# Patient Record
(demographics unavailable — no encounter records)

---

## 2025-03-28 NOTE — PHYSICAL EXAM
[Wheezing] : wheezing [Subcostal Retractions] : subcostal retractions [Suprasternal Retractions] : suprasternal retractions [NL] : warm, clear

## 2025-03-28 NOTE — HISTORY OF PRESENT ILLNESS
[de-identified] : TROUBLE BREATHING [FreeTextEntry6] : trouble breathing for past 3 hours sent home from school by nurse for fever and wheezing albuterol given at 10:30 am feels tired and achy

## 2025-03-28 NOTE — DISCUSSION/SUMMARY
[FreeTextEntry1] : 02 SAT 94% ALBUTEROL AND ATROVENT GIVEN IN OFFICE DECADRON 10 MG GIVEN IN OFFICE OBSERVED X 20 MIN 02 SAT 98% RA RR 30 MORE COMFORTABLE RAPID FLU NEG

## 2025-04-02 NOTE — HISTORY OF PRESENT ILLNESS
[de-identified] : RE CK CHEST [FreeTextEntry6] : RE CK CHEST/ ASTHMA Seen in our office 3/28 - s/p dexamethasone in office and neb tx feeling much better completed Prednisone on Albuterol PRN, Qvar BID

## 2025-04-02 NOTE — HISTORY OF PRESENT ILLNESS
[de-identified] : RE CK CHEST [FreeTextEntry6] : RE CK CHEST/ ASTHMA Seen in our office 3/28 - s/p dexamethasone in office and neb tx feeling much better completed Prednisone on Albuterol PRN, Qvar BID

## 2025-04-02 NOTE — DISCUSSION/SUMMARY
[FreeTextEntry1] : Stay on QVar for maintenance 2 puffs bid may dec to 1 puff bid in 1 month if well REC YEARLY FLU  FLU VAC DECLINED